# Patient Record
Sex: FEMALE | URBAN - METROPOLITAN AREA
[De-identification: names, ages, dates, MRNs, and addresses within clinical notes are randomized per-mention and may not be internally consistent; named-entity substitution may affect disease eponyms.]

---

## 2024-01-31 ENCOUNTER — TRANSCRIBE ORDERS (OUTPATIENT)
Facility: HOSPITAL | Age: 44
End: 2024-01-31

## 2024-01-31 DIAGNOSIS — R06.02 SHORTNESS OF BREATH: ICD-10-CM

## 2024-01-31 DIAGNOSIS — R07.9 CHEST PAIN, UNSPECIFIED TYPE: Primary | ICD-10-CM

## 2024-02-26 ENCOUNTER — TRANSCRIBE ORDERS (OUTPATIENT)
Facility: HOSPITAL | Age: 44
End: 2024-02-26

## 2024-02-26 DIAGNOSIS — R06.02 SHORTNESS OF BREATH: Primary | ICD-10-CM

## 2024-04-02 ENCOUNTER — HOSPITAL ENCOUNTER (OUTPATIENT)
Facility: HOSPITAL | Age: 44
Discharge: HOME | End: 2024-04-05
Attending: INTERNAL MEDICINE
Payer: OTHER GOVERNMENT

## 2024-04-02 DIAGNOSIS — R06.02 SHORTNESS OF BREATH: ICD-10-CM

## 2024-04-02 DIAGNOSIS — R07.9 CHEST PAIN, UNSPECIFIED TYPE: ICD-10-CM

## 2024-04-02 PROCEDURE — 70496 CT ANGIOGRAPHY HEAD: CPT

## 2024-04-02 PROCEDURE — 6360000004 HC RX CONTRAST MEDICATION: Performed by: INTERNAL MEDICINE

## 2024-04-02 PROCEDURE — 71275 CT ANGIOGRAPHY CHEST: CPT

## 2024-04-02 RX ADMIN — IOPAMIDOL 100 ML: 755 INJECTION, SOLUTION INTRAVENOUS at 15:00

## 2024-07-01 ENCOUNTER — HOSPITAL ENCOUNTER (OUTPATIENT)
Facility: HOSPITAL | Age: 44
Discharge: HOME | End: 2024-07-04
Payer: OTHER GOVERNMENT

## 2024-07-01 VITALS — HEART RATE: 62 BPM | DIASTOLIC BLOOD PRESSURE: 64 MMHG | SYSTOLIC BLOOD PRESSURE: 103 MMHG

## 2024-07-01 DIAGNOSIS — R06.02 SHORTNESS OF BREATH: ICD-10-CM

## 2024-07-01 PROCEDURE — 6370000000 HC RX 637 (ALT 250 FOR IP): Performed by: INTERNAL MEDICINE

## 2024-07-01 PROCEDURE — 6360000004 HC RX CONTRAST MEDICATION: Performed by: INTERNAL MEDICINE

## 2024-07-01 PROCEDURE — 75574 CT ANGIO HRT W/3D IMAGE: CPT

## 2024-07-01 RX ORDER — PAROXETINE 10 MG/1
10 TABLET, FILM COATED ORAL EVERY MORNING
COMMUNITY

## 2024-07-01 RX ORDER — NITROGLYCERIN 0.4 MG/1
0.4 TABLET SUBLINGUAL ONCE
Status: COMPLETED | OUTPATIENT
Start: 2024-07-01 | End: 2024-07-01

## 2024-07-01 RX ORDER — ESTRADIOL 1 MG/1
1 TABLET ORAL DAILY
COMMUNITY

## 2024-07-01 RX ORDER — PANTOPRAZOLE SODIUM 20 MG/1
20 TABLET, DELAYED RELEASE ORAL DAILY
COMMUNITY

## 2024-07-01 RX ADMIN — IOPAMIDOL 80 ML: 755 INJECTION, SOLUTION INTRAVENOUS at 12:49

## 2024-07-01 RX ADMIN — NITROGLYCERIN 0.8 MG: 0.4 TABLET SUBLINGUAL at 12:40

## 2024-07-01 NOTE — PROGRESS NOTES
12:20 PM Patient brought back from the waiting room for preparation of CT Coronary scan.    Patient was not prescribed oral beta blocker protocol to be taken at home prior to the exam.    20 gauge diffusics IV initiated in the right AC.  Brisk blood return present,  POC Creat lab studies were requested to be drawn. Patient denies history of diabetes and is not taking any diabetic medications. Brisk NSS flush with ease.    Allergies / medications and brief medical history obtained and entered into chart. Patient denies history of NTG use, reports history of headaches but denies any today. Patient is 4 10\" and weight 109 lb.  Current vitals are as follows:     / 54    HR 55    Nitroglycerin 0.4 mg SL x 1 given to patient at 12:40. Taken to CT via stretcher or W/C  A repeat dose of Nitroglycerin SL 0.4 MG 12:45 on CT table, supine position; as tolerated first dose well. see MAR. Cardiac leads placed on patient. Patient reports mild headache.    See flowsheets for subsequent VS during scan.    Test completed, vitals after exam are as follows:     64 sitting  HR 62    Denies dizziness upon arising. Reports headache subsiding. Advised to drink at least 8 glasses of water today to flush contrast from system. IV catheter removed intact, site is clear. Patient discharged from CT room.    Florina Sanders RN

## 2025-04-30 ENCOUNTER — TRANSCRIBE ORDERS (OUTPATIENT)
Facility: HOSPITAL | Age: 45
End: 2025-04-30

## 2025-04-30 DIAGNOSIS — N64.9 BREAST LESION: Primary | ICD-10-CM

## 2025-04-30 DIAGNOSIS — Z12.31 OTHER SCREENING MAMMOGRAM: Primary | ICD-10-CM

## 2025-06-26 ENCOUNTER — HOSPITAL ENCOUNTER (OUTPATIENT)
Facility: HOSPITAL | Age: 45
Discharge: HOME | End: 2025-06-29
Payer: OTHER GOVERNMENT

## 2025-06-26 VITALS — BODY MASS INDEX: 23.16 KG/M2 | HEIGHT: 60 IN | WEIGHT: 118 LBS

## 2025-06-26 DIAGNOSIS — R92.8 ABNORMAL MAMMOGRAM OF LEFT BREAST: ICD-10-CM

## 2025-06-26 PROCEDURE — 76642 ULTRASOUND BREAST LIMITED: CPT

## 2025-06-26 PROCEDURE — G0279 TOMOSYNTHESIS, MAMMO: HCPCS
